# Patient Record
Sex: MALE | Race: WHITE | NOT HISPANIC OR LATINO | Employment: FULL TIME | URBAN - METROPOLITAN AREA
[De-identification: names, ages, dates, MRNs, and addresses within clinical notes are randomized per-mention and may not be internally consistent; named-entity substitution may affect disease eponyms.]

---

## 2023-02-22 LAB
ALANINE AMINOTRANSFERASE (SGPT) (U/L) IN SER/PLAS: 19 U/L (ref 10–52)
ALBUMIN (G/DL) IN SER/PLAS: 4.4 G/DL (ref 3.4–5)
ALBUMIN (MG/L) IN URINE: 119.8 MG/L
ALBUMIN/CREATININE (UG/MG) IN URINE: 26.3 UG/MG CRT (ref 0–30)
ALKALINE PHOSPHATASE (U/L) IN SER/PLAS: 60 U/L (ref 33–120)
ANION GAP IN SER/PLAS: 11 MMOL/L (ref 10–20)
ASPARTATE AMINOTRANSFERASE (SGOT) (U/L) IN SER/PLAS: 16 U/L (ref 9–39)
BILIRUBIN TOTAL (MG/DL) IN SER/PLAS: 0.7 MG/DL (ref 0–1.2)
CALCIUM (MG/DL) IN SER/PLAS: 9.8 MG/DL (ref 8.6–10.3)
CARBON DIOXIDE, TOTAL (MMOL/L) IN SER/PLAS: 30 MMOL/L (ref 21–32)
CHLORIDE (MMOL/L) IN SER/PLAS: 104 MMOL/L (ref 98–107)
CHOLESTEROL (MG/DL) IN SER/PLAS: 121 MG/DL (ref 0–199)
CHOLESTEROL IN HDL (MG/DL) IN SER/PLAS: 55.8 MG/DL
CHOLESTEROL/HDL RATIO: 2.2
CREATININE (MG/DL) IN SER/PLAS: 1.46 MG/DL (ref 0.5–1.3)
CREATININE (MG/DL) IN URINE: 456 MG/DL (ref 20–370)
ESTIMATED AVERAGE GLUCOSE FOR HBA1C: 154 MG/DL
GFR MALE: 56 ML/MIN/1.73M2
GLUCOSE (MG/DL) IN SER/PLAS: 190 MG/DL (ref 74–99)
HEMOGLOBIN A1C/HEMOGLOBIN TOTAL IN BLOOD: 7 %
LDL: 52 MG/DL (ref 0–99)
POTASSIUM (MMOL/L) IN SER/PLAS: 4.3 MMOL/L (ref 3.5–5.3)
PROSTATE SPECIFIC ANTIGEN,SCREEN: 1.18 NG/ML (ref 0–4)
PROTEIN TOTAL: 6.8 G/DL (ref 6.4–8.2)
SODIUM (MMOL/L) IN SER/PLAS: 141 MMOL/L (ref 136–145)
THYROTROPIN (MIU/L) IN SER/PLAS BY DETECTION LIMIT <= 0.05 MIU/L: 1.66 MIU/L (ref 0.44–3.98)
TRIGLYCERIDE (MG/DL) IN SER/PLAS: 67 MG/DL (ref 0–149)
UREA NITROGEN (MG/DL) IN SER/PLAS: 19 MG/DL (ref 6–23)
VLDL: 13 MG/DL (ref 0–40)

## 2023-05-25 LAB
ESTIMATED AVERAGE GLUCOSE FOR HBA1C: 160 MG/DL
HEMOGLOBIN A1C/HEMOGLOBIN TOTAL IN BLOOD: 7.2 %

## 2023-10-29 DIAGNOSIS — E11.9 TYPE 2 DIABETES MELLITUS WITHOUT COMPLICATIONS (MULTI): ICD-10-CM

## 2023-10-30 RX ORDER — LEVOTHYROXINE SODIUM 100 UG/1
100 TABLET ORAL DAILY
COMMUNITY
End: 2023-11-29

## 2023-10-30 RX ORDER — LINAGLIPTIN 5 MG/1
5 TABLET, FILM COATED ORAL DAILY
COMMUNITY
Start: 2023-03-13 | End: 2023-11-21 | Stop reason: WASHOUT

## 2023-10-30 RX ORDER — METFORMIN HYDROCHLORIDE 500 MG/1
500 TABLET ORAL
COMMUNITY
End: 2023-11-21 | Stop reason: WASHOUT

## 2023-10-30 RX ORDER — METFORMIN HYDROCHLORIDE 500 MG/1
500 TABLET ORAL
Qty: 180 TABLET | Refills: 1 | Status: SHIPPED | OUTPATIENT
Start: 2023-10-30 | End: 2023-11-09 | Stop reason: SDUPTHER

## 2023-10-30 RX ORDER — ATORVASTATIN CALCIUM 10 MG/1
10 TABLET, FILM COATED ORAL DAILY
COMMUNITY
End: 2023-11-21

## 2023-10-30 RX ORDER — TADALAFIL 5 MG/1
TABLET ORAL
COMMUNITY
Start: 2019-08-26

## 2023-10-30 RX ORDER — TAMSULOSIN HYDROCHLORIDE 0.4 MG/1
CAPSULE ORAL
COMMUNITY
Start: 2023-10-29

## 2023-10-30 RX ORDER — SITAGLIPTIN 100 MG/1
100 TABLET, FILM COATED ORAL DAILY
COMMUNITY
End: 2023-11-21 | Stop reason: SDUPTHER

## 2023-10-30 RX ORDER — CELECOXIB 200 MG/1
200 CAPSULE ORAL DAILY PRN
COMMUNITY
Start: 2023-06-26 | End: 2023-11-21 | Stop reason: WASHOUT

## 2023-11-09 DIAGNOSIS — E11.9 TYPE 2 DIABETES MELLITUS WITHOUT COMPLICATIONS (MULTI): ICD-10-CM

## 2023-11-09 RX ORDER — METFORMIN HYDROCHLORIDE 500 MG/1
1500 TABLET ORAL DAILY
Qty: 270 TABLET | Refills: 1 | Status: SHIPPED | OUTPATIENT
Start: 2023-11-09 | End: 2023-11-21 | Stop reason: SDUPTHER

## 2023-11-10 DIAGNOSIS — R73.9 HYPERGLYCEMIA: Primary | ICD-10-CM

## 2023-11-13 ENCOUNTER — LAB (OUTPATIENT)
Dept: LAB | Facility: LAB | Age: 58
End: 2023-11-13
Payer: COMMERCIAL

## 2023-11-13 DIAGNOSIS — R73.9 HYPERGLYCEMIA: ICD-10-CM

## 2023-11-13 LAB
ALBUMIN SERPL BCP-MCNC: 4.5 G/DL (ref 3.4–5)
ALP SERPL-CCNC: 57 U/L (ref 33–120)
ALT SERPL W P-5'-P-CCNC: 22 U/L (ref 10–52)
ANION GAP SERPL CALC-SCNC: 13 MMOL/L (ref 10–20)
AST SERPL W P-5'-P-CCNC: 19 U/L (ref 9–39)
BILIRUB SERPL-MCNC: 0.7 MG/DL (ref 0–1.2)
BUN SERPL-MCNC: 22 MG/DL (ref 6–23)
CALCIUM SERPL-MCNC: 9.7 MG/DL (ref 8.6–10.3)
CHLORIDE SERPL-SCNC: 102 MMOL/L (ref 98–107)
CHOLEST SERPL-MCNC: 137 MG/DL (ref 0–199)
CHOLESTEROL/HDL RATIO: 2.6
CO2 SERPL-SCNC: 27 MMOL/L (ref 21–32)
CREAT SERPL-MCNC: 1.32 MG/DL (ref 0.5–1.3)
EST. AVERAGE GLUCOSE BLD GHB EST-MCNC: 166 MG/DL
GFR SERPL CREATININE-BSD FRML MDRD: 63 ML/MIN/1.73M*2
GLUCOSE SERPL-MCNC: 169 MG/DL (ref 74–99)
HBA1C MFR BLD: 7.4 %
HDLC SERPL-MCNC: 52.2 MG/DL
LDLC SERPL CALC-MCNC: 67 MG/DL
NON HDL CHOLESTEROL: 85 MG/DL (ref 0–149)
POTASSIUM SERPL-SCNC: 4 MMOL/L (ref 3.5–5.3)
PROT SERPL-MCNC: 6.8 G/DL (ref 6.4–8.2)
SODIUM SERPL-SCNC: 138 MMOL/L (ref 136–145)
TRIGL SERPL-MCNC: 88 MG/DL (ref 0–149)
TSH SERPL-ACNC: 1.77 MIU/L (ref 0.44–3.98)
VLDL: 18 MG/DL (ref 0–40)

## 2023-11-13 PROCEDURE — 80061 LIPID PANEL: CPT

## 2023-11-13 PROCEDURE — 36415 COLL VENOUS BLD VENIPUNCTURE: CPT

## 2023-11-13 PROCEDURE — 84443 ASSAY THYROID STIM HORMONE: CPT

## 2023-11-13 PROCEDURE — 83036 HEMOGLOBIN GLYCOSYLATED A1C: CPT

## 2023-11-13 PROCEDURE — 80053 COMPREHEN METABOLIC PANEL: CPT

## 2023-11-20 ENCOUNTER — LAB (OUTPATIENT)
Dept: LAB | Facility: LAB | Age: 58
End: 2023-11-20
Payer: COMMERCIAL

## 2023-11-20 DIAGNOSIS — R73.9 HYPERGLYCEMIA: ICD-10-CM

## 2023-11-20 LAB
CREAT UR-MCNC: 96.1 MG/DL (ref 20–370)
MICROALBUMIN UR-MCNC: <7 MG/L
MICROALBUMIN/CREAT UR: NORMAL MG/G{CREAT}

## 2023-11-20 PROCEDURE — 82043 UR ALBUMIN QUANTITATIVE: CPT

## 2023-11-20 PROCEDURE — 82570 ASSAY OF URINE CREATININE: CPT

## 2023-11-21 ENCOUNTER — TELEMEDICINE (OUTPATIENT)
Dept: ENDOCRINOLOGY | Facility: CLINIC | Age: 58
End: 2023-11-21
Payer: COMMERCIAL

## 2023-11-21 DIAGNOSIS — E03.9 HYPOTHYROIDISM, UNSPECIFIED TYPE: ICD-10-CM

## 2023-11-21 DIAGNOSIS — E78.2 MIXED HYPERLIPIDEMIA: ICD-10-CM

## 2023-11-21 DIAGNOSIS — E11.9 TYPE 2 DIABETES MELLITUS WITHOUT COMPLICATIONS (MULTI): ICD-10-CM

## 2023-11-21 DIAGNOSIS — E11.69 TYPE 2 DIABETES MELLITUS WITH OTHER SPECIFIED COMPLICATION, UNSPECIFIED WHETHER LONG TERM INSULIN USE (MULTI): Primary | ICD-10-CM

## 2023-11-21 PROBLEM — E78.5 HYPERLIPIDEMIA: Status: ACTIVE | Noted: 2023-11-21

## 2023-11-21 PROCEDURE — 99443 PR PHYS/QHP TELEPHONE EVALUATION 21-30 MIN: CPT | Performed by: HOSPITALIST

## 2023-11-21 RX ORDER — METFORMIN HYDROCHLORIDE 500 MG/1
1000 TABLET ORAL
Qty: 360 TABLET | Refills: 0 | Status: SHIPPED | OUTPATIENT
Start: 2023-11-21 | End: 2024-04-15

## 2023-11-21 RX ORDER — SITAGLIPTIN 100 MG/1
100 TABLET, FILM COATED ORAL DAILY
Qty: 90 TABLET | Refills: 2 | Status: SHIPPED | OUTPATIENT
Start: 2023-11-21

## 2023-11-21 RX ORDER — ATORVASTATIN CALCIUM 10 MG/1
10 TABLET, FILM COATED ORAL DAILY
Qty: 90 TABLET | Refills: 1 | Status: SHIPPED | OUTPATIENT
Start: 2023-11-21

## 2023-11-21 ASSESSMENT — ENCOUNTER SYMPTOMS
SLEEP DISTURBANCE: 0
FREQUENCY: 0
ABDOMINAL DISTENTION: 0
CONSTITUTIONAL NEGATIVE: 1
NERVOUS/ANXIOUS: 0
EYE ITCHING: 0
CONSTIPATION: 0
AGITATION: 0
DYSURIA: 0
DIARRHEA: 0
NAUSEA: 0
ABDOMINAL PAIN: 0
PHOTOPHOBIA: 0
SHORTNESS OF BREATH: 0
PALPITATIONS: 0
SORE THROAT: 0
ARTHRALGIAS: 0
VOMITING: 0
LIGHT-HEADEDNESS: 0
TROUBLE SWALLOWING: 0
TREMORS: 0
VOICE CHANGE: 0
HEADACHES: 0
CHEST TIGHTNESS: 0

## 2023-11-21 NOTE — PROGRESS NOTES
Subjective   Patient ID: Jessee Mead is a 58 y.o. male who presents for Diabetes.  Diabetes  Pertinent negatives for hypoglycemia include no headaches, nervousness/anxiousness, pallor or tremors. Pertinent negatives for diabetes include no chest pain and no polyuria.     See AP     Review of Systems   Constitutional: Negative.    HENT:  Negative for sore throat, trouble swallowing and voice change.    Eyes:  Negative for photophobia, itching and visual disturbance.   Respiratory:  Negative for chest tightness and shortness of breath.    Cardiovascular:  Negative for chest pain and palpitations.   Gastrointestinal:  Negative for abdominal distention, abdominal pain, constipation, diarrhea, nausea and vomiting.   Endocrine: Negative for cold intolerance, heat intolerance and polyuria.   Genitourinary:  Negative for dysuria and frequency.   Musculoskeletal:  Negative for arthralgias.   Skin:  Negative for pallor.   Allergic/Immunologic: Negative for environmental allergies.   Neurological:  Negative for tremors, light-headedness and headaches.   Psychiatric/Behavioral:  Negative for agitation and sleep disturbance. The patient is not nervous/anxious.        Objective   Physical Exam  Not done as it was a tele visit     Assessment/Plan   Diagnoses and all orders for this visit:  Type 2 diabetes mellitus with other specified complication, unspecified whether long term insulin use (CMS/[facility])  Mixed hyperlipidemia  Hypothyroidism, unspecified type     57 yo man with T2 DM came for follow up   Dx ~ 20 yrs ago       Current regimen:   Metformin 500 mg - 3 tabs a day ( denies any diarrhea )   Januvia 100 mg daily       glipizide was discontinued in past due to low Bgs but he was taking rarely for high and when he was on Gcs     Continues to do MODIFIED KETO DIET   Interval history : severe back pain - 2 GC injections, now he has no back pain , started treadmill again   Checks BG 1-2 / d- higher BG as per him       A1c  7.4%, which is above goal       PLAN:   - INCREASE metformin to 500 mg 2 tab BID with  food   - januvia - continue   -HOLD OFF On rybelsus   - check BG at least twice a day and bring glucometer next visit   - continue statin, LDL at goal   - not on ACEi/ ARB, will discuss next visit   - clinically and biochemically euthyroid, continue same dose of levothyroxine       RTC 4m       SH- travels a lot for work, runs marathons in past. very active in general. daughter doing doctorate in PT   On  modified keto.   He travelled to [place] in Sept 2023  Back pain improved.   kids live in [place]. Their neighbor has a bird sanctuary

## 2023-11-29 DIAGNOSIS — E03.9 HYPOTHYROIDISM, UNSPECIFIED: ICD-10-CM

## 2023-11-29 RX ORDER — LEVOTHYROXINE SODIUM 100 UG/1
100 TABLET ORAL DAILY
Qty: 90 TABLET | Refills: 1 | Status: SHIPPED | OUTPATIENT
Start: 2023-11-29

## 2024-02-14 ENCOUNTER — LAB (OUTPATIENT)
Dept: LAB | Facility: LAB | Age: 59
End: 2024-02-14
Payer: COMMERCIAL

## 2024-02-14 DIAGNOSIS — E11.69 TYPE 2 DIABETES MELLITUS WITH OTHER SPECIFIED COMPLICATION, UNSPECIFIED WHETHER LONG TERM INSULIN USE (MULTI): ICD-10-CM

## 2024-02-14 LAB
ALBUMIN SERPL BCP-MCNC: 4.5 G/DL (ref 3.4–5)
ALP SERPL-CCNC: 53 U/L (ref 33–120)
ALT SERPL W P-5'-P-CCNC: 19 U/L (ref 10–52)
ANION GAP SERPL CALC-SCNC: 11 MMOL/L (ref 10–20)
AST SERPL W P-5'-P-CCNC: 16 U/L (ref 9–39)
BILIRUB SERPL-MCNC: 0.7 MG/DL (ref 0–1.2)
BUN SERPL-MCNC: 18 MG/DL (ref 6–23)
CALCIUM SERPL-MCNC: 9.5 MG/DL (ref 8.6–10.3)
CHLORIDE SERPL-SCNC: 102 MMOL/L (ref 98–107)
CHOLEST SERPL-MCNC: 139 MG/DL (ref 0–199)
CHOLESTEROL/HDL RATIO: 2.8
CO2 SERPL-SCNC: 31 MMOL/L (ref 21–32)
CREAT SERPL-MCNC: 1.29 MG/DL (ref 0.5–1.3)
EGFRCR SERPLBLD CKD-EPI 2021: 65 ML/MIN/1.73M*2
EST. AVERAGE GLUCOSE BLD GHB EST-MCNC: 160 MG/DL
GLUCOSE SERPL-MCNC: 176 MG/DL (ref 74–99)
HBA1C MFR BLD: 7.2 %
HDLC SERPL-MCNC: 50.4 MG/DL
LDLC SERPL CALC-MCNC: 67 MG/DL
NON HDL CHOLESTEROL: 89 MG/DL (ref 0–149)
POTASSIUM SERPL-SCNC: 4.4 MMOL/L (ref 3.5–5.3)
PROT SERPL-MCNC: 6.8 G/DL (ref 6.4–8.2)
SODIUM SERPL-SCNC: 140 MMOL/L (ref 136–145)
TRIGL SERPL-MCNC: 106 MG/DL (ref 0–149)
VLDL: 21 MG/DL (ref 0–40)

## 2024-02-14 PROCEDURE — 80061 LIPID PANEL: CPT

## 2024-02-14 PROCEDURE — 80053 COMPREHEN METABOLIC PANEL: CPT

## 2024-02-14 PROCEDURE — 83036 HEMOGLOBIN GLYCOSYLATED A1C: CPT

## 2024-02-14 PROCEDURE — 36415 COLL VENOUS BLD VENIPUNCTURE: CPT

## 2024-02-15 ENCOUNTER — APPOINTMENT (OUTPATIENT)
Dept: ENDOCRINOLOGY | Facility: CLINIC | Age: 59
End: 2024-02-15
Payer: COMMERCIAL

## 2024-03-26 DIAGNOSIS — E11.69 TYPE 2 DIABETES MELLITUS WITH OTHER SPECIFIED COMPLICATION, UNSPECIFIED WHETHER LONG TERM INSULIN USE (MULTI): Primary | ICD-10-CM

## 2024-03-26 RX ORDER — BLOOD SUGAR DIAGNOSTIC
STRIP MISCELLANEOUS
Qty: 200 STRIP | Refills: 1 | Status: SHIPPED | OUTPATIENT
Start: 2024-03-26

## 2024-04-13 DIAGNOSIS — E11.69 TYPE 2 DIABETES MELLITUS WITH OTHER SPECIFIED COMPLICATION, UNSPECIFIED WHETHER LONG TERM INSULIN USE (MULTI): ICD-10-CM

## 2024-04-13 DIAGNOSIS — E11.9 TYPE 2 DIABETES MELLITUS WITHOUT COMPLICATIONS (MULTI): ICD-10-CM

## 2024-04-15 RX ORDER — METFORMIN HYDROCHLORIDE 500 MG/1
TABLET ORAL
Qty: 270 TABLET | Refills: 0 | Status: SHIPPED | OUTPATIENT
Start: 2024-04-15

## 2024-06-13 ENCOUNTER — APPOINTMENT (OUTPATIENT)
Dept: ENDOCRINOLOGY | Facility: CLINIC | Age: 59
End: 2024-06-13
Payer: COMMERCIAL

## 2024-06-13 VITALS
HEIGHT: 68 IN | BODY MASS INDEX: 26.07 KG/M2 | DIASTOLIC BLOOD PRESSURE: 77 MMHG | WEIGHT: 172 LBS | SYSTOLIC BLOOD PRESSURE: 133 MMHG

## 2024-06-13 DIAGNOSIS — E11.9 TYPE 2 DIABETES MELLITUS WITHOUT COMPLICATIONS (MULTI): ICD-10-CM

## 2024-06-13 DIAGNOSIS — E11.69 TYPE 2 DIABETES MELLITUS WITH OTHER SPECIFIED COMPLICATION, UNSPECIFIED WHETHER LONG TERM INSULIN USE (MULTI): ICD-10-CM

## 2024-06-13 LAB
POC FINGERSTICK BLOOD GLUCOSE: 148 MG/DL (ref 70–100)
POC HEMOGLOBIN A1C: 6.9 % (ref 4.2–6.5)

## 2024-06-13 PROCEDURE — 3075F SYST BP GE 130 - 139MM HG: CPT | Performed by: HOSPITALIST

## 2024-06-13 PROCEDURE — 3048F LDL-C <100 MG/DL: CPT | Performed by: HOSPITALIST

## 2024-06-13 PROCEDURE — 83036 HEMOGLOBIN GLYCOSYLATED A1C: CPT | Performed by: HOSPITALIST

## 2024-06-13 PROCEDURE — 82962 GLUCOSE BLOOD TEST: CPT | Performed by: HOSPITALIST

## 2024-06-13 PROCEDURE — 3051F HG A1C>EQUAL 7.0%<8.0%: CPT | Performed by: HOSPITALIST

## 2024-06-13 PROCEDURE — 3078F DIAST BP <80 MM HG: CPT | Performed by: HOSPITALIST

## 2024-06-13 PROCEDURE — 99214 OFFICE O/P EST MOD 30 MIN: CPT | Performed by: HOSPITALIST

## 2024-06-13 RX ORDER — METFORMIN HYDROCHLORIDE 500 MG/1
1000 TABLET ORAL
Qty: 180 TABLET | Refills: 2 | Status: SHIPPED | OUTPATIENT
Start: 2024-06-13

## 2024-06-13 ASSESSMENT — ENCOUNTER SYMPTOMS
ABDOMINAL PAIN: 0
LIGHT-HEADEDNESS: 0
NAUSEA: 0
ARTHRALGIAS: 0
DYSURIA: 0
FREQUENCY: 0
ABDOMINAL DISTENTION: 0
PALPITATIONS: 0
CHEST TIGHTNESS: 0
VOMITING: 0
EYE ITCHING: 0
DIARRHEA: 0
SHORTNESS OF BREATH: 0
PHOTOPHOBIA: 0
TROUBLE SWALLOWING: 0
TREMORS: 0
SORE THROAT: 0
CONSTIPATION: 0
HEADACHES: 0
AGITATION: 0
CONSTITUTIONAL NEGATIVE: 1
SLEEP DISTURBANCE: 0
NERVOUS/ANXIOUS: 0
VOICE CHANGE: 0

## 2024-06-13 NOTE — PROGRESS NOTES
Subjective   Patient ID: Jessee Mead is a 58 y.o. male who presents for Diabetes (Dx dm: prior 2003 /Failed: glipizide (low bg)/PCP: Dr. Ramirez/Podiatry: does not see one /Eye exam: 1/2024/Pt testing glucose 1-2 times daily, did not bring meter  /2/14/24 hga1c 7.2%).  Lab Results   Component Value Date    HGBA1C 6.9 (A) 06/13/2024      HPI   See AP     Review of Systems   Constitutional: Negative.    HENT:  Negative for sore throat, trouble swallowing and voice change.    Eyes:  Negative for photophobia, itching and visual disturbance.   Respiratory:  Negative for chest tightness and shortness of breath.    Cardiovascular:  Negative for chest pain and palpitations.   Gastrointestinal:  Negative for abdominal distention, abdominal pain, constipation, diarrhea, nausea and vomiting.   Endocrine: Negative for cold intolerance, heat intolerance and polyuria.   Genitourinary:  Negative for dysuria and frequency.   Musculoskeletal:  Negative for arthralgias.   Skin:  Negative for pallor.   Allergic/Immunologic: Negative for environmental allergies.   Neurological:  Negative for tremors, light-headedness and headaches.   Psychiatric/Behavioral:  Negative for agitation and sleep disturbance. The patient is not nervous/anxious.        Objective   Physical Exam  Constitutional:       Appearance: Normal appearance.   HENT:      Head: Normocephalic.      Nose: Nose normal.      Mouth/Throat:      Mouth: Mucous membranes are moist.   Eyes:      Extraocular Movements: Extraocular movements intact.   Cardiovascular:      Rate and Rhythm: Normal rate.   Pulmonary:      Effort: Pulmonary effort is normal. No respiratory distress.   Abdominal:      General: There is no distension.   Musculoskeletal:         General: Normal range of motion.      Cervical back: Normal range of motion and neck supple.   Skin:     General: Skin is warm and dry.   Neurological:      Mental Status: He is alert and oriented to person, place, and time.  "  Psychiatric:         Mood and Affect: Mood normal.       Visit Vitals  /77   Ht 1.727 m (5' 8\")   Wt 78 kg (172 lb)   BMI 26.15 kg/m²   BSA 1.93 m²        Assessment/Plan           57 yo man with T2 DM came for follow up   Dx ~ 20 yrs ago       Current regimen:   Metformin 500 mg - 2 tab BID   Januvia 100 mg daily       glipizide was discontinued in past due to low Bgs but he was taking rarely for high and when he was on Gcs . Occ took metaglip 2/ month causing low BG      Continues to do MODIFIED KETO DIET   Interval history : severe back pain - 2 GC injections, now he has no back pain , started treadmill again   Checks BG 1-2 / d- higher BG as per him       A1c 7.4%, which is above goal       PLAN:   - discussed GLP 1 agonist given he has been progressively gaining wieght and wants to loose weight   - discussed SE in detail MOA, SE risks   - start rybelsus 3 mg  - Am alone 16 oz water and sarahi 30 min later   - will stop januvia 1 m after rybelsus   - continue same metformin   - check BG at least twice a day and bring glucometer next visit   - continue statin, LDL at goal   - not on ACEi/ ARB, will discuss next visit   - clinically and biochemically euthyroid, continue same dose of levothyroxine         Given DEXCOM G 7 cgm sample   \   RTC 4m       SH- travels a lot for work, runs marathons in past. very active in general. daughter doing doctorate in PT   On  modified keto. / IF   Planning marathon in sept again   He travelled to [place] in Sept 2023  Back pain improved.   kids live in [place]. Their neighbor has a bird sanctuary        "

## 2024-06-17 DIAGNOSIS — E11.9 TYPE 2 DIABETES MELLITUS WITHOUT COMPLICATIONS (MULTI): ICD-10-CM

## 2024-06-17 DIAGNOSIS — E03.9 HYPOTHYROIDISM, UNSPECIFIED: ICD-10-CM

## 2024-06-17 RX ORDER — LEVOTHYROXINE SODIUM 100 UG/1
100 TABLET ORAL DAILY
Qty: 90 TABLET | Refills: 0 | Status: SHIPPED | OUTPATIENT
Start: 2024-06-17

## 2024-06-17 RX ORDER — ATORVASTATIN CALCIUM 10 MG/1
10 TABLET, FILM COATED ORAL DAILY
Qty: 90 TABLET | Refills: 0 | Status: SHIPPED | OUTPATIENT
Start: 2024-06-17

## 2024-08-23 ENCOUNTER — APPOINTMENT (OUTPATIENT)
Dept: ENDOCRINOLOGY | Facility: CLINIC | Age: 59
End: 2024-08-23
Payer: COMMERCIAL

## 2024-08-23 DIAGNOSIS — E78.2 MIXED HYPERLIPIDEMIA: ICD-10-CM

## 2024-08-23 DIAGNOSIS — E11.69 TYPE 2 DIABETES MELLITUS WITH OTHER SPECIFIED COMPLICATION, UNSPECIFIED WHETHER LONG TERM INSULIN USE (MULTI): Primary | ICD-10-CM

## 2024-08-23 DIAGNOSIS — E03.9 HYPOTHYROIDISM, UNSPECIFIED TYPE: ICD-10-CM

## 2024-08-23 DIAGNOSIS — E11.9 TYPE 2 DIABETES MELLITUS WITHOUT COMPLICATIONS (MULTI): ICD-10-CM

## 2024-08-23 PROCEDURE — 3051F HG A1C>EQUAL 7.0%<8.0%: CPT | Performed by: HOSPITALIST

## 2024-08-23 PROCEDURE — 3048F LDL-C <100 MG/DL: CPT | Performed by: HOSPITALIST

## 2024-08-23 PROCEDURE — 99214 OFFICE O/P EST MOD 30 MIN: CPT | Performed by: HOSPITALIST

## 2024-08-23 RX ORDER — METFORMIN HYDROCHLORIDE 500 MG/1
1000 TABLET ORAL
Qty: 180 TABLET | Refills: 2 | Status: SHIPPED | OUTPATIENT
Start: 2024-08-23

## 2024-08-23 ASSESSMENT — ENCOUNTER SYMPTOMS
PHOTOPHOBIA: 0
FREQUENCY: 0
NAUSEA: 0
EYE ITCHING: 0
SLEEP DISTURBANCE: 0
HEADACHES: 0
PALPITATIONS: 0
ARTHRALGIAS: 0
ABDOMINAL DISTENTION: 0
NERVOUS/ANXIOUS: 0
DIARRHEA: 0
LIGHT-HEADEDNESS: 0
TREMORS: 0
CONSTITUTIONAL NEGATIVE: 1
VOMITING: 0
DYSURIA: 0
VOICE CHANGE: 0
CHEST TIGHTNESS: 0
ABDOMINAL PAIN: 0
SORE THROAT: 0
SHORTNESS OF BREATH: 0
AGITATION: 0
CONSTIPATION: 0
TROUBLE SWALLOWING: 0

## 2024-08-23 NOTE — PROGRESS NOTES
Subjective   Patient ID: Jessee Mead is a 58 y.o. male who presents for Diabetes (VIRTUAL VISIT:::: /Dx dm: prior 2003 /Failed: glipizide (low bg)/PCP: Dr. Ramirez/Podiatry: does not see one /Eye exam: 1/2024/Pt testing glucose 1-2 times daily).  Lab Results   Component Value Date    HGBA1C 6.9 (A) 06/13/2024      HPI   See AP     Review of Systems   Constitutional: Negative.    HENT:  Negative for sore throat, trouble swallowing and voice change.    Eyes:  Negative for photophobia, itching and visual disturbance.   Respiratory:  Negative for chest tightness and shortness of breath.    Cardiovascular:  Negative for chest pain and palpitations.   Gastrointestinal:  Negative for abdominal distention, abdominal pain, constipation, diarrhea, nausea and vomiting.   Endocrine: Negative for cold intolerance, heat intolerance and polyuria.   Genitourinary:  Negative for dysuria and frequency.   Musculoskeletal:  Negative for arthralgias.   Skin:  Negative for pallor.   Allergic/Immunologic: Negative for environmental allergies.   Neurological:  Negative for tremors, light-headedness and headaches.   Psychiatric/Behavioral:  Negative for agitation and sleep disturbance. The patient is not nervous/anxious.        Objective   Physical Exam NO vitals due to virtual visit      NAD AAOx3   MMM   EOM nml   Mood appropriate.    Assessment/Plan   Diagnoses and all orders for this visit:  Type 2 diabetes mellitus with other specified complication, unspecified whether long term insulin use (Multi)  -     metFORMIN (Glucophage) 500 mg tablet; Take 2 tablets (1,000 mg) by mouth 2 times daily (morning and late afternoon).  -     semaglutide (Rybelsus) 7 mg tablet; Take 1 tablet (7 mg) by mouth once daily.  -     Comprehensive metabolic panel; Future  -     Hemoglobin A1c; Future  -     Lipid Panel; Future  -     TSH with reflex to Free T4 if abnormal; Future  -     Albumin-Creatinine Ratio, Urine Random; Future  Type 2 diabetes  mellitus without complications (Multi)  -     metFORMIN (Glucophage) 500 mg tablet; Take 2 tablets (1,000 mg) by mouth 2 times daily (morning and late afternoon).  Mixed hyperlipidemia  Hypothyroidism, unspecified type           57 yo man with T2 DM came for follow up   Dx ~ 20 yrs ago       Current regimen:   Metformin 500 mg - 2 tab BID ( rare diarrhea)   Januvia 100 mg daily   Rybelsus 3 mg one daily ( did have nausea initially few days , it resolved)       glipizide was discontinued in past due to low Bgs but he was taking rarely for high and when he was on Gcs . Occ took metaglip 2/ month causing low BG      Continues to do MODIFIED KETO DIET , not doing IF anymore.   Interval history : severe back pain - 2 GC injections in past , also has knee pain    Not working out or running much as he  used to do     Checks BG 1-2 / d- BG are higher high 100s - 200s. He mentions he did loose 2 lbs ~ , but did not see any difference in the BG readings      PLAN:   - denies any SE from 3 mg dose.   - INCREASE DOSE TO 7 mg daily of rybelsus ( can use 3 mg by taking 2 of these)   - STOP JANUVIA  - continue metformin , discussed using ER if the diarrhea is consistent   - if BG > 180 consistently advise to call.      - may discuss SGLT2 inh in future.   - discussed SE in detail MOA, SE risks   - increase oral hydration.   - check BG at least twice a day and bring glucometer next visit.     - continue statin, LDL at goal   - not on ACEi/ ARB, will discuss next visit   - clinically  euthyroid, continue same dose of levothyroxine       Given DEXCOM G 7 cgm sample  in past        Due for labs : blood work ordered in EMR    RTC 4m       SH- travels a lot for work, runs marathons in past. very active in general. daughter doing doctorate in PT   On  modified keto.  Not  doing IF at this time.   Was planning marathon but now has left knee pain , has back pain too not running as  much.   He travelled to [facility] in Sept 2023  Back pain  improved.   kids live in [place]. Their neighbor has a bird sanctuary

## 2024-09-12 DIAGNOSIS — E11.9 TYPE 2 DIABETES MELLITUS WITHOUT COMPLICATIONS (MULTI): ICD-10-CM

## 2024-09-12 DIAGNOSIS — E03.9 HYPOTHYROIDISM, UNSPECIFIED: ICD-10-CM

## 2024-09-12 RX ORDER — ATORVASTATIN CALCIUM 10 MG/1
10 TABLET, FILM COATED ORAL DAILY
Qty: 90 TABLET | Refills: 0 | Status: SHIPPED | OUTPATIENT
Start: 2024-09-12

## 2024-09-12 RX ORDER — LEVOTHYROXINE SODIUM 100 UG/1
100 TABLET ORAL DAILY
Qty: 90 TABLET | Refills: 0 | Status: SHIPPED | OUTPATIENT
Start: 2024-09-12

## 2024-10-26 DIAGNOSIS — E03.9 HYPOTHYROIDISM, UNSPECIFIED: ICD-10-CM

## 2024-10-28 RX ORDER — LEVOTHYROXINE SODIUM 100 UG/1
TABLET ORAL
Qty: 90 TABLET | Refills: 0 | Status: SHIPPED | OUTPATIENT
Start: 2024-10-28

## 2024-11-21 ENCOUNTER — APPOINTMENT (OUTPATIENT)
Dept: ENDOCRINOLOGY | Facility: CLINIC | Age: 59
End: 2024-11-21
Payer: COMMERCIAL

## 2024-11-21 VITALS
HEIGHT: 68 IN | SYSTOLIC BLOOD PRESSURE: 144 MMHG | DIASTOLIC BLOOD PRESSURE: 86 MMHG | BODY MASS INDEX: 25.46 KG/M2 | WEIGHT: 168 LBS

## 2024-11-21 DIAGNOSIS — E11.9 TYPE 2 DIABETES MELLITUS WITHOUT COMPLICATIONS (MULTI): ICD-10-CM

## 2024-11-21 DIAGNOSIS — E11.69 TYPE 2 DIABETES MELLITUS WITH OTHER SPECIFIED COMPLICATION, UNSPECIFIED WHETHER LONG TERM INSULIN USE (MULTI): Primary | ICD-10-CM

## 2024-11-21 DIAGNOSIS — E03.9 HYPOTHYROIDISM, UNSPECIFIED TYPE: ICD-10-CM

## 2024-11-21 LAB
POC FINGERSTICK BLOOD GLUCOSE: 163 MG/DL (ref 70–100)
POC HEMOGLOBIN A1C: 7.7 % (ref 4.2–6.5)

## 2024-11-21 PROCEDURE — 82962 GLUCOSE BLOOD TEST: CPT | Performed by: HOSPITALIST

## 2024-11-21 PROCEDURE — 99214 OFFICE O/P EST MOD 30 MIN: CPT | Performed by: HOSPITALIST

## 2024-11-21 PROCEDURE — 83036 HEMOGLOBIN GLYCOSYLATED A1C: CPT | Performed by: HOSPITALIST

## 2024-11-21 PROCEDURE — 3051F HG A1C>EQUAL 7.0%<8.0%: CPT | Performed by: HOSPITALIST

## 2024-11-21 PROCEDURE — 3077F SYST BP >= 140 MM HG: CPT | Performed by: HOSPITALIST

## 2024-11-21 PROCEDURE — 3079F DIAST BP 80-89 MM HG: CPT | Performed by: HOSPITALIST

## 2024-11-21 PROCEDURE — 3048F LDL-C <100 MG/DL: CPT | Performed by: HOSPITALIST

## 2024-11-21 PROCEDURE — 3008F BODY MASS INDEX DOCD: CPT | Performed by: HOSPITALIST

## 2024-11-21 RX ORDER — TAMSULOSIN HYDROCHLORIDE 0.4 MG/1
0.4 CAPSULE ORAL DAILY
COMMUNITY

## 2024-11-21 RX ORDER — METFORMIN HYDROCHLORIDE 500 MG/1
1000 TABLET, EXTENDED RELEASE ORAL
COMMUNITY
End: 2024-11-21 | Stop reason: SDUPTHER

## 2024-11-21 RX ORDER — METFORMIN HYDROCHLORIDE 500 MG/1
1000 TABLET, EXTENDED RELEASE ORAL
Qty: 360 TABLET | Refills: 2 | Status: SHIPPED | OUTPATIENT
Start: 2024-11-21

## 2024-11-21 NOTE — PROGRESS NOTES
"Subjective   Patient ID: Jessee Mead is a 59 y.o. male who presents for Diabetes (Dx dm: prior 2003 /Failed: glipizide (low bg)/PCP: Dr. Ramirez/Podiatry: does not see one /Eye exam: 1/2024/Pt testing glucose 1-2 times daily/6/13/2024 hga1c 6.9% /Not happy with numbers- did bring food logs ).  Lab Results   Component Value Date    HGBA1C 7.7 (A) 11/21/2024      HPI  See assessment and plan  Review of Systems    Objective   Physical Exam Visit Vitals  /86   Ht 1.727 m (5' 8\")   Wt 76.2 kg (168 lb)   BMI 25.54 kg/m²   BSA 1.91 m²        Assessment/Plan   Diagnoses and all orders for this visit:  Type 2 diabetes mellitus with other specified complication, unspecified whether long term insulin use (Multi)  -     POCT glucose manually resulted  -     POCT glycosylated hemoglobin (Hb A1C) manually resulted  -     SITagliptin phosphate (Januvia) 100 mg tablet; Take 1 tablet (100 mg) by mouth once daily.  -     metFORMIN  mg 24 hr tablet; Take 2 tablets (1,000 mg) by mouth 2 times daily (morning and late afternoon). Do not crush, chew, or split.  -     Referral to Clinical Pharmacy; Future  Type 2 diabetes mellitus without complications (Multi)  Hypothyroidism, unspecified type              60yo man with T2 DM came for follow up   Dx ~ 20 yrs ago       Current regimen:   Metformin 500 mg - 2 tab BID   Rybelsus 7 mg once daily    Januvia started when dose of Rybelsus was increased from 3 to 7 mg  He mentions blood sugars were better controlled on Januvia and Rybelsus  But he has been slightly less active than usual, he was unable to run a half marathon that he was training in September  He keeps a very close eye on his diet and notes down what he eats daily with his blood sugar readings  He does take occasional glipizide small dose if blood sugar is high    Continues to do MODIFIED KETO DIET most of the time  Interval history : Glucocorticoid injection in the past  Slightly less active than before but " continues to Trysul  Checks BG 1-2 / d- high 100-200 sometimes      A1c 7.7% above goal      PLAN:   Discussed stopping Rybelsus and starting Mounjaro but  He would like to try Januvia instead of that    Advised that he can try metformin and Januvia for 1 month if blood sugars are better controlled we will continue that for now  My preference would be to add Mounjaro once weekly instead of Rybelsus  Given sample of Mounjaro 2.5 mg once weekly.  If blood sugar not controlled on metformin and Januvia advised to start 2.5 mg once weekly Mounjaro for 2 weeks if no side effects will send 5 mg once weekly thereafter    Continue lifestyle modification.  Discussed mechanism of action side effects including pancreatitis, cholecystitis, dehydration, GI side effects with Mounjaro    -Continue to check BG at least twice a day   - continue statin, LDL at goal   - not on ACEi/ ARB, will discuss next visit      Given DEXCOM G 7 cgm sample in past.  Will hold off trying that given it might give too much information and may make him overwhelmed      # Hypothyroidism  Clinically euthyroid  Levothyroxine 100 mcg daily    TFT next visit.  Last TFT in 2023  \   RTC 1 month virtually      SH- travels a lot for work, runs marathons in past. very active in general. daughter doing doctorate in PT   On  modified keto. / IF   Could not run half marathon in September 2024 due to knee pain  He travelled to [place] in Sept 2023  Back pain improved.   kids live in [place]. Their neighbor has a bird sanctuary

## 2024-12-09 DIAGNOSIS — E11.9 TYPE 2 DIABETES MELLITUS WITHOUT COMPLICATIONS (MULTI): ICD-10-CM

## 2024-12-10 RX ORDER — ATORVASTATIN CALCIUM 10 MG/1
10 TABLET, FILM COATED ORAL DAILY
Qty: 90 TABLET | Refills: 0 | Status: SHIPPED | OUTPATIENT
Start: 2024-12-10

## 2024-12-11 NOTE — PROGRESS NOTES
Patient is sent at the request of Azael Desai MD for my opinion regarding diabetes.  My recommendations below will be communicated back to the requesting provider by way of shared medical record.    Recommendations:   Stop Januvia and start Mounjaro 2.5mg once weekly on 12/28  Continue metformin  ________________________________________________________________________    Subjective   Past Medical History:  Patient Active Problem List   Diagnosis    Type 2 diabetes mellitus    Hyperlipidemia    Hypothyroidism     Interim:  Jessee Mead is a 59 y.o. male presents today for new patient visit with sima PharmD for Type 2 Diabetes Mellitus. Last seen by Azael Desai on 11/21/24 where endo wanted to switch Rybelsus to Mounjaro 2.5mg weekly and a sample was provided, however pt wanted to trial switching Rybelsus back to Januvia first to try to avoid an injection.    Today the patient reports his blood sugars have improved since resuming Januvia, however he still plans to trial Mounjaro but is waiting until 12/28 when he returns from traveling. States he prefers to avoid injections, but understands that that his blood sugars are above goal.    Diabetes Pharmacotherapy:    Metformin ER 1000 mg BID  Januvia 100mg daily  States that he has 60 days left  Insurance said that it wont be covered next year  **Has not started Mounjaro    Adherence: denies non-adherence    Previously trialed meds:   Glipizide - lows  Rybelsus - reports it is ineffective    Social:  Current diet: on average, 3 meals per day, reports he has been eating more carbs recently  Breakfast - 2 eggs + spinach + avocado + cheese  Lunch - salad + protein  Dinner - pulled pork + salad + chips  Snack - mixed nuts  Fluids - water, coffee (SF creamer + almond milk), diet pop rarely  Current exercise: biking and HIT, has a  for weight bearing exercise    Allergies:  Patient has no known allergies.    Medication list:  Current Outpatient  Medications   Medication Instructions    atorvastatin (LIPITOR) 10 mg, oral, Daily    blood sugar diagnostic (OneTouch Ultra Test) strip USE 2 STRIP DAILY    levothyroxine (Synthroid, Levoxyl) 100 mcg tablet TAKE 1 TABLET BY MOUTH EARLY IN THE MORNING.. TAKE ON AN EMPTY STOMACH AT THE SAME TIME EACH DAY    metFORMIN (GLUCOPHAGE) 1,000 mg, oral, 2 times daily (morning and late afternoon)    metFORMIN XR (GLUCOPHAGE-XR) 1,000 mg, oral, 2 times daily (morning and late afternoon), Do not crush, chew, or split.    SITagliptin phosphate (JANUVIA) 100 mg, oral, Daily    tadalafil (Cialis) 5 mg tablet Take by mouth.    tamsulosin (FLOMAX) 0.4 mg, Daily        Objective   Last Recorded Vitals:  BP Readings from Last 3 Encounters:   11/21/24 144/86   06/13/24 133/77   07/14/23 128/74     Wt Readings from Last 3 Encounters:   11/21/24 76.2 kg (168 lb)   06/13/24 78 kg (172 lb)   07/14/23 77.6 kg (171 lb)     BMI Readings from Last 1 Encounters:   11/21/24 25.54 kg/m²      Labs  A1C  Lab Results   Component Value Date    HGBA1C 7.7 (A) 11/21/2024    HGBA1C 8.0 (H) 09/24/2024    HGBA1C 6.9 (A) 06/13/2024     BMP/LFTs  Lab Results   Component Value Date    CREATININE 1.29 02/14/2024    CREATININE 1.32 (H) 11/13/2023    CREATININE 1.46 (H) 02/22/2023    EGFR 65 02/14/2024    EGFR 63 11/13/2023    GLUCOSE 176 (H) 02/14/2024     02/14/2024    K 4.4 02/14/2024     02/14/2024    CALCIUM 9.5 02/14/2024    CO2 31 02/14/2024    BUN 18 02/14/2024    ALT 19 02/14/2024    AST 16 02/14/2024    ALKPHOS 53 02/14/2024    BILITOT 0.7 02/14/2024     Lipids  Lab Results   Component Value Date    TRIG 106 02/14/2024    CHOL 139 02/14/2024    LDLF 52 02/22/2023    LDLCALC 67 02/14/2024    HDL 50.4 02/14/2024     Urine Albumin Creatinine Ratio  Lab Results   Component Value Date    MICROALBCREA  11/20/2023      Comment:      One or more analytes used in this calculation is outside of the analytical measurement range. Calculation cannot be  "performed.    MICROALBCREA 26.3 02/22/2023     Additional labs:  No results found for: \"FRUCTOSAMINE\", \"CPEPTIDE\", \"LIG94QM\", \"NTIB\", \"ZNT8A\", \"INSAB\"    Home glucose monitoring:  Hypoglycemia: denies readings <70 mg/dL or s/sx of hypoglycemia  Date FBG PreHS   12-Dec 170    11-Dec 133 134   10-Dec 174    6-Dec 140    5-Dec 160    4-Dec 159    3-Dec 147    2-Dec 118           134       Assessment/Plan   Type 2 Diabetes Mellitus  Goal A1C <6.5%, above goal as of 11/2024. AM readings from home above goal, no hypoglycemia noted. Pt has not yet started Mounjaro d/t upcoming travel from [place]. Reviewed glycemic goals to show additional glucose lowering needed, pt is agreeable to stopping Januvia and starting Mounjaro after his upcoming trip. Patient also plans to try and improve diet as well, reports he has been eating more carbs recently.  Plan:  Stop Januvia and start Mounjaro 2.5mg once weekly on 12/28  Continue metformin  Home glucose monitoring:   Patient encouraged to continue SMBG at least 2-3x/week, alternating FBG and 2hr PPBG  Education Provided to Patient:   Glycemic goals  Benefits and proper administration of Mounjaro  Primary prevention:   Therapy: Moderate intensity statin   LDL result meets goal (2/2024)  Renal:  CKD: stage 2 - GFR 60-89  ACR: Incalculable (11/2023)  Renal protective agents: soon to be GLP1  DM medications are dosed appropriately for renal function  Labs: due for ACR with next labs, unable to discuss today  PharmD follow-up: 1 month  Endo follow-up: 12/16/24    Patient agreeable to plan as above, contact information provided for any future questions or concerns.    Edmundo Aguero, Pia    Type of encounter: virtual    Continue all meds under the continuation of care with the referring provider and clinical pharmacy team.    "

## 2024-12-12 ENCOUNTER — APPOINTMENT (OUTPATIENT)
Dept: PHARMACY | Facility: HOSPITAL | Age: 59
End: 2024-12-12
Payer: COMMERCIAL

## 2024-12-12 DIAGNOSIS — E11.69 TYPE 2 DIABETES MELLITUS WITH OTHER SPECIFIED COMPLICATION, UNSPECIFIED WHETHER LONG TERM INSULIN USE (MULTI): ICD-10-CM

## 2024-12-16 ENCOUNTER — APPOINTMENT (OUTPATIENT)
Dept: ENDOCRINOLOGY | Facility: CLINIC | Age: 59
End: 2024-12-16
Payer: COMMERCIAL

## 2024-12-16 DIAGNOSIS — E78.2 MIXED HYPERLIPIDEMIA: ICD-10-CM

## 2024-12-16 DIAGNOSIS — E11.69 TYPE 2 DIABETES MELLITUS WITH OTHER SPECIFIED COMPLICATION, UNSPECIFIED WHETHER LONG TERM INSULIN USE (MULTI): Primary | ICD-10-CM

## 2024-12-16 DIAGNOSIS — E03.9 HYPOTHYROIDISM, UNSPECIFIED TYPE: ICD-10-CM

## 2024-12-16 PROCEDURE — 3048F LDL-C <100 MG/DL: CPT | Performed by: HOSPITALIST

## 2024-12-16 PROCEDURE — 99214 OFFICE O/P EST MOD 30 MIN: CPT | Performed by: HOSPITALIST

## 2024-12-16 PROCEDURE — 3051F HG A1C>EQUAL 7.0%<8.0%: CPT | Performed by: HOSPITALIST

## 2024-12-16 ASSESSMENT — ENCOUNTER SYMPTOMS
ARTHRALGIAS: 0
LIGHT-HEADEDNESS: 0
SLEEP DISTURBANCE: 0
FREQUENCY: 0
HEADACHES: 0
AGITATION: 0
CONSTIPATION: 0
NERVOUS/ANXIOUS: 0
EYE ITCHING: 0
DYSURIA: 0
PHOTOPHOBIA: 0
SORE THROAT: 0
NAUSEA: 0
PALPITATIONS: 0
VOICE CHANGE: 0
TROUBLE SWALLOWING: 0
SHORTNESS OF BREATH: 0
CONSTITUTIONAL NEGATIVE: 1
ABDOMINAL DISTENTION: 0
ABDOMINAL PAIN: 0
CHEST TIGHTNESS: 0
DIARRHEA: 0
TREMORS: 0
VOMITING: 0

## 2024-12-16 NOTE — PROGRESS NOTES
Subjective   Patient ID: Jessee Mead is a 59 y.o. male who presents for Diabetes (VIRTUAL VISIT::: /Dx dm: prior 2003 /Failed: glipizide (low bg)/PCP: Dr. Ramirez/Podiatry: does not see one /Eye exam: 1/2024/Pt testing glucose 1-2 times daily).  Lab Results   Component Value Date    HGBA1C 7.7 (A) 11/21/2024      HPI  See assessment and plan  Review of Systems   Constitutional: Negative.    HENT:  Negative for sore throat, trouble swallowing and voice change.    Eyes:  Negative for photophobia, itching and visual disturbance.   Respiratory:  Negative for chest tightness and shortness of breath.    Cardiovascular:  Negative for chest pain and palpitations.   Gastrointestinal:  Negative for abdominal distention, abdominal pain, constipation, diarrhea, nausea and vomiting.   Endocrine: Negative for cold intolerance, heat intolerance and polyuria.   Genitourinary:  Negative for dysuria and frequency.   Musculoskeletal:  Negative for arthralgias.   Skin:  Negative for pallor.   Allergic/Immunologic: Negative for environmental allergies.   Neurological:  Negative for tremors, light-headedness and headaches.   Psychiatric/Behavioral:  Negative for agitation and sleep disturbance. The patient is not nervous/anxious.        Objective   Physical Exam NO vitals due to virtual visit   NAD alert oriented  EOM normal  Mucous membrane moist  Mood appropriate    Assessment/Plan   Diagnoses and all orders for this visit:  Type 2 diabetes mellitus with other specified complication, unspecified whether long term insulin use (Multi)  Hypothyroidism, unspecified type  Mixed hyperlipidemia     58yo man with T2 DM came for follow up   Dx ~ 20 yrs ago       Current regimen:   Metformin 500 mg - 2 tab BID   Januvia 100 mg once daily    Past medications:   Stopped Rybelsus as it did not help his blood sugars as much as Januvia  Given sample of Mounjaro last visit which he has not tried as he plans to travel and will start on December 28  th    He mentioned last visit he had been slightly less active than usual, he was unable to run a half marathon that he was training in September 2024  He keeps a very close eye on his diet and notes down what he eats daily with his blood sugar readings  He does take occasional glipizide small dose if blood sugar is high    Continues to do MODIFIED KETO DIET most of the time  Interval history : Glucocorticoid injection in the past    Checks BG 1-2 / d-  -140s in morning , 130s later during day      A1c 7.7% last visit      PLAN:   Stop Januvia when starting Mounjaro.  He mentions he will try and if it does not help with his blood sugar control he will go back on Januvia.  He mentions Januvia might not be covered by insurance as they might cover saxagliptin instead    He might need a letter for coverage for Januvia if he plans to continue within future from now he does have 45 days worth of supply    Continue lifestyle modification.  Discussed mechanism of action side effects of Mounjaro last visit including pancreatitis, cholecystitis, dehydration, GI side effects with Mounjaro    -Continue to check BG at least twice a day   - continue statin, LDL at goal   - not on ACEi/ ARB, will discuss next visit      Given DEXCOM G 7 cgm sample in past.  Will hold off trying that given it might give too much information and may make him overwhelmed      # Hypothyroidism  Clinically euthyroid  Levothyroxine 100 mcg daily    Repeat TFT last TFT in 2023  \   RTC 2m  Continued clinical pharmacy follow-up      SH- travels a lot for work, runs marathons in past. very active in general. daughter doing doctorate in PT   On  modified keto. / IF   Could not run half marathon in September 2024 due to knee pain  He travelled to [place] in Sept 2023  Back pain improved.   kids live in [place]. Their neighbor has a bird sanctuary

## 2024-12-19 ENCOUNTER — TELEPHONE (OUTPATIENT)
Dept: ENDOCRINOLOGY | Facility: CLINIC | Age: 59
End: 2024-12-19
Payer: COMMERCIAL

## 2025-01-14 ENCOUNTER — APPOINTMENT (OUTPATIENT)
Dept: PHARMACY | Facility: HOSPITAL | Age: 60
End: 2025-01-14
Payer: COMMERCIAL

## 2025-01-14 DIAGNOSIS — E11.69 TYPE 2 DIABETES MELLITUS WITH OTHER SPECIFIED COMPLICATION, UNSPECIFIED WHETHER LONG TERM INSULIN USE (MULTI): ICD-10-CM

## 2025-01-14 RX ORDER — TIRZEPATIDE 5 MG/.5ML
5 INJECTION, SOLUTION SUBCUTANEOUS WEEKLY
Qty: 2 ML | Refills: 11 | Status: SHIPPED | OUTPATIENT
Start: 2025-01-14

## 2025-01-14 NOTE — PROGRESS NOTES
Patient is sent at the request of Azael Desai MD for my opinion regarding diabetes.  My recommendations below will be communicated back to the requesting provider by way of shared medical record.    Recommendations:   Increase Mounjaro to 5mg weekly  ________________________________________________________________________    Subjective   Past Medical History:  Patient Active Problem List   Diagnosis    Type 2 diabetes mellitus    Hyperlipidemia    Hypothyroidism     Interim:  Jessee Mead is a 59 y.o. male presents today for follow up visit with sima Palacio for Type 2 Diabetes Mellitus. Last seen by myself (Edmundo Aguero) on 12/12/24 where the patient was instructed to stop Januvia and Start Mounjaro 2.5mg once weekly on 12/28. Pt also seen by sima on 12/16/24 where this plan was echoed.    Today the patient reports he has been tolerating Mounjaro well, only had some mild diarrhea with the first dose and has been doing well since. Has noticed improvements in blood sugar since switching from Januvia to Mounjaro, but also reports he has been eating more carbs d/t the holidays and because the Mounjaro is decreasing how much his sugar spikes after carbs.    Diabetes Pharmacotherapy:    Metformin ER 1000 mg BID  Mounjaro 2.5mg weekly  Has 3 doses so far  Some diarrhea after 1st injection, but has improved since then    Adherence: denies non-adherence     Previously trialed meds:   Glipizide - lows  Rybelsus - reports it is ineffective  Januvia - stopped when switched to Mounjaro     Social:  Current diet: on average, now eating 2-3 meals per day, a little appetite suppression with Mounjaro  Breakfast - 2 eggs + spinach + avocado + cheese  Lunch - salad + protein  Dinner - pulled pork + salad + chips  Snack - mixed nuts, sometimes ice cream and cookies  Fluids - water, coffee (SF creamer + almond milk), diet pop rarely  Current exercise: biking and HIT, has a  for weight bearing  exercise      Allergies:  Patient has no known allergies.    Medication list:  Current Outpatient Medications   Medication Instructions    atorvastatin (LIPITOR) 10 mg, oral, Daily    blood sugar diagnostic (OneTouch Ultra Test) strip USE 2 STRIP DAILY    levothyroxine (Synthroid, Levoxyl) 100 mcg tablet TAKE 1 TABLET BY MOUTH EARLY IN THE MORNING.. TAKE ON AN EMPTY STOMACH AT THE SAME TIME EACH DAY    metFORMIN XR (GLUCOPHAGE-XR) 1,000 mg, oral, 2 times daily (morning and late afternoon), Do not crush, chew, or split.    SITagliptin phosphate (JANUVIA) 100 mg, oral, Daily    tadalafil (Cialis) 5 mg tablet Take by mouth.    tamsulosin (FLOMAX) 0.4 mg, Daily        Objective   Last Recorded Vitals:  BP Readings from Last 3 Encounters:   11/21/24 144/86   06/13/24 133/77   07/14/23 128/74     Wt Readings from Last 3 Encounters:   11/21/24 76.2 kg (168 lb)   06/13/24 78 kg (172 lb)   07/14/23 77.6 kg (171 lb)     BMI Readings from Last 1 Encounters:   11/21/24 25.54 kg/m²      Labs  A1C  Lab Results   Component Value Date    HGBA1C 7.7 (A) 11/21/2024    HGBA1C 8.0 (H) 09/24/2024    HGBA1C 6.9 (A) 06/13/2024     BMP/LFTs  Lab Results   Component Value Date    CREATININE 1.29 02/14/2024    CREATININE 1.32 (H) 11/13/2023    CREATININE 1.46 (H) 02/22/2023    EGFR 65 02/14/2024    EGFR 63 11/13/2023    GLUCOSE 176 (H) 02/14/2024     02/14/2024    K 4.4 02/14/2024     02/14/2024    CALCIUM 9.5 02/14/2024    CO2 31 02/14/2024    BUN 18 02/14/2024    ALT 19 02/14/2024    AST 16 02/14/2024    ALKPHOS 53 02/14/2024    BILITOT 0.7 02/14/2024     Lipids  Lab Results   Component Value Date    TRIG 106 02/14/2024    CHOL 139 02/14/2024    LDLF 52 02/22/2023    LDLCALC 67 02/14/2024    HDL 50.4 02/14/2024     Urine Albumin Creatinine Ratio  Lab Results   Component Value Date    MICROALBCREA  11/20/2023      Comment:      One or more analytes used in this calculation is outside of the analytical measurement range.  "Calculation cannot be performed.    MICROALBCREA 26.3 02/22/2023     ASCVD risk  The ASCVD Risk score (Audery VOGEL, et al., 2019) failed to calculate for the following reasons:    The smoking status is invalid    Additional labs:  No results found for: \"FRUCTOSAMINE\", \"CPEPTIDE\", \"OLJ07IW\", \"NTIB\", \"ZNT8A\", \"INSAB\"    Home glucose monitoring:  Hypoglycemia: denies readings <70 mg/dL or s/sx of hypoglycemia  Date FBG   14-Jan 140   12-Jan 120   11-Jan 208   10-Jan 139   9-Jan 157   8-Jan 165   7-Jan 149                Assessment/Plan   Type 2 Diabetes Mellitus  Goal A1C <6.5%, above goal as of 11/2024. AM readings from home above goal on avg, no hypoglycemia noted. Pt currently tolerating transition from Januvia to Mounjaro w/o ADRs. However ,has also been consuming more carbs after this switch d/t the improvement in blood sugar control. Discussed how diet impacts blood sugars, pt receptive, but also agreeable to continue titrating Mounjaro.  Plan:  Increase Mounjaro from 2.5mg --> 5mg once weekly  Continue metformin  Home glucose monitoring:   Patient encouraged to continue SMBG at least 2-3x/week, alternating FBG and 2hr PPBG  Education Provided to Patient:   Glycemic goals  Potential ADRs of Mounjaro dose increase  Primary prevention:   Therapy: Moderate intensity statin   LDL result meets goal (2/2024)  Renal:  CKD: stage 2 - GFR 60-89  ACR: Incalculable (11/2023)  Renal protective agents: soon to be GLP1  DM medications are dosed appropriately for renal function  Labs: Endo ordered lipids, CMP, and ACR  PharmD follow-up: 2 months  Endo follow-up: 2/20/25    Patient agreeable to plan as above, contact information provided for any future questions or concerns.    Edmundo Aguero, PharmD    Type of encounter: virtual    Continue all meds under the continuation of care with the referring provider and clinical pharmacy team.    "

## 2025-02-10 ENCOUNTER — TELEPHONE (OUTPATIENT)
Dept: ENDOCRINOLOGY | Facility: CLINIC | Age: 60
End: 2025-02-10
Payer: COMMERCIAL

## 2025-02-10 NOTE — TELEPHONE ENCOUNTER
Dr Retana, we have a follow up on 2/20 but my current script of Monjaro 5mg will run out 2/15. We can wait until my appt to refill if you prefer but I’m not sure if it’s something they keep in stock. Also, I’d like to get a 90 day script (12 pens) with refills like my other maintenance meds. Thanks, Justin

## 2025-02-20 ENCOUNTER — APPOINTMENT (OUTPATIENT)
Dept: ENDOCRINOLOGY | Facility: CLINIC | Age: 60
End: 2025-02-20
Payer: COMMERCIAL

## 2025-02-20 VITALS
SYSTOLIC BLOOD PRESSURE: 108 MMHG | BODY MASS INDEX: 24.86 KG/M2 | HEIGHT: 68 IN | WEIGHT: 164 LBS | DIASTOLIC BLOOD PRESSURE: 62 MMHG

## 2025-02-20 DIAGNOSIS — E78.5 HYPERLIPIDEMIA, UNSPECIFIED HYPERLIPIDEMIA TYPE: ICD-10-CM

## 2025-02-20 DIAGNOSIS — E11.69 TYPE 2 DIABETES MELLITUS WITH OTHER SPECIFIED COMPLICATION, UNSPECIFIED WHETHER LONG TERM INSULIN USE (MULTI): Primary | ICD-10-CM

## 2025-02-20 DIAGNOSIS — E03.9 HYPOTHYROIDISM, UNSPECIFIED TYPE: ICD-10-CM

## 2025-02-20 LAB
POC FINGERSTICK BLOOD GLUCOSE: 163 MG/DL (ref 70–100)
POC HEMOGLOBIN A1C: 7 % (ref 4.2–6.5)

## 2025-02-20 PROCEDURE — 99214 OFFICE O/P EST MOD 30 MIN: CPT | Performed by: HOSPITALIST

## 2025-02-20 PROCEDURE — 3074F SYST BP LT 130 MM HG: CPT | Performed by: HOSPITALIST

## 2025-02-20 PROCEDURE — 83036 HEMOGLOBIN GLYCOSYLATED A1C: CPT | Performed by: HOSPITALIST

## 2025-02-20 PROCEDURE — 82962 GLUCOSE BLOOD TEST: CPT | Performed by: HOSPITALIST

## 2025-02-20 PROCEDURE — 3008F BODY MASS INDEX DOCD: CPT | Performed by: HOSPITALIST

## 2025-02-20 PROCEDURE — 3078F DIAST BP <80 MM HG: CPT | Performed by: HOSPITALIST

## 2025-02-20 RX ORDER — PSEUDOEPHEDRINE HCL 120 MG/1
120 TABLET, FILM COATED, EXTENDED RELEASE ORAL EVERY 12 HOURS
COMMUNITY
Start: 2025-02-06 | End: 2026-02-06

## 2025-02-20 RX ORDER — TIRZEPATIDE 5 MG/.5ML
5 INJECTION, SOLUTION SUBCUTANEOUS WEEKLY
Qty: 6 ML | Refills: 2 | Status: SHIPPED | OUTPATIENT
Start: 2025-02-20

## 2025-02-20 ASSESSMENT — ENCOUNTER SYMPTOMS
SLEEP DISTURBANCE: 0
SORE THROAT: 0
NERVOUS/ANXIOUS: 0
ARTHRALGIAS: 0
EYE ITCHING: 0
AGITATION: 0
ABDOMINAL PAIN: 0
CONSTIPATION: 0
CONSTITUTIONAL NEGATIVE: 1
DIARRHEA: 0
TROUBLE SWALLOWING: 0
DYSURIA: 0
PHOTOPHOBIA: 0
FREQUENCY: 0
VOMITING: 0
TREMORS: 0
ABDOMINAL DISTENTION: 0
HEADACHES: 0
LIGHT-HEADEDNESS: 0
PALPITATIONS: 0
CHEST TIGHTNESS: 0
NAUSEA: 0
SHORTNESS OF BREATH: 0
VOICE CHANGE: 0

## 2025-02-20 NOTE — PROGRESS NOTES
Subjective   Patient ID: Jessee Mead is a 59 y.o. male who presents for Diabetes (IN OFFICE VISIT /Dx dm: prior 2003 /Failed: glipizide (low bg)/PCP: Dr. Ramirez/Podiatry: does not see one /Eye exam: 1/2024/Pt testing glucose 1-2 times daily/ALSO WORKING WITH  PHARMACY TO [facility] ).  Lab Results   Component Value Date    HGBA1C 7.0 (A) 02/20/2025      HPI    Review of Systems   Constitutional: Negative.    HENT:  Negative for sore throat, trouble swallowing and voice change.    Eyes:  Negative for photophobia, itching and visual disturbance.   Respiratory:  Negative for chest tightness and shortness of breath.    Cardiovascular:  Negative for chest pain and palpitations.   Gastrointestinal:  Negative for abdominal distention, abdominal pain, constipation, diarrhea, nausea and vomiting.   Endocrine: Negative for cold intolerance, heat intolerance and polyuria.   Genitourinary:  Negative for dysuria and frequency.   Musculoskeletal:  Negative for arthralgias.   Skin:  Negative for pallor.   Allergic/Immunologic: Negative for environmental allergies.   Neurological:  Negative for tremors, light-headedness and headaches.   Psychiatric/Behavioral:  Negative for agitation and sleep disturbance. The patient is not nervous/anxious.        Objective   Physical Exam  Constitutional:       Appearance: Normal appearance.   HENT:      Head: Normocephalic.      Nose: Nose normal.      Mouth/Throat:      Mouth: Mucous membranes are moist.   Eyes:      Extraocular Movements: Extraocular movements intact.   Cardiovascular:      Rate and Rhythm: Normal rate.   Pulmonary:      Effort: Pulmonary effort is normal. No respiratory distress.   Abdominal:      General: There is no distension.   Musculoskeletal:         General: Normal range of motion.      Cervical back: Normal range of motion and neck supple.   Skin:     General: Skin is warm and dry.   Neurological:      Mental Status: He is alert and oriented to person, place, and  "time.   Psychiatric:         Mood and Affect: Mood normal.      Visit Vitals  /62   Ht 1.727 m (5' 8\")   Wt 74.4 kg (164 lb)   BMI 24.94 kg/m²   BSA 1.89 m²        Assessment/Plan   Diagnoses and all orders for this visit:  Type 2 diabetes mellitus with other specified complication, unspecified whether long term insulin use (Multi)  -     POCT glycosylated hemoglobin (Hb A1C) manually resulted  -     POCT glucose manually resulted  -     tirzepatide (Mounjaro) 5 mg/0.5 mL pen injector; Inject 5 mg under the skin 1 (one) time per week.  -     Comprehensive metabolic panel; Future  -     Lipid Panel; Future  -     Hemoglobin A1c; Future  -     Albumin-Creatinine Ratio, Urine Random; Future  Hypothyroidism, unspecified type  -     TSH with reflex to Free T4 if abnormal; Future  Hyperlipidemia, unspecified hyperlipidemia type            60yo man with T2 DM came for follow up   Dx ~ 20 yrs ago       Current regimen:   Metformin 500 mg - 2 tab BID   Mounjaro 5 mg once weekly ( 12/ 2024)     Past medications:   Stop Januvia when starting Mounjaro  Stopped Rybelsus as it did not help his blood sugars as much as Januvia      He keeps a very close eye on his diet  He mentioned last visit he had been slightly less active than usual, he was unable to run a half marathon that he was training in September 2024    He was doing doing MODIFIED KETO DIET .  Not at this time  IChecks BG 1-2 / d-  -160s in morning , 130s later during day    A1c improved and around goal of < 7 %      PLAN:   No change in regimen.  Will hold off increasing the Mounjaro dose at this time.  Continue lifestyle modification.  Discussed mechanism of action side effects of Mounjaro last visit including pancreatitis, cholecystitis, dehydration, GI side effects with Mounjaro  -Continue to check BG at least twice a day   - continue statin, LDL at goal   - not on ACEi/ ARB, will discuss next visit      Given DEXCOM G 7 cgm sample in past.  Will hold " off trying that given it might give too much information and may make him overwhelmed      # Hypothyroidism  Clinically euthyroid  Levothyroxine 100 mcg daily    Repeat blood work  \   RTC 2m      SH- travels a lot for work, runs marathons in past. very active in general. daughter doing doctorate in PT   On  modified keto. / IF   Could not run half marathon in September 2024 due to knee pain  He travelled to [facility] in Sept 2023  Back pain improved.   kids live in [place]. Their neighbor has a bird sanctuary   He mentioned last visit he had been slightly less active than usual, he was unable to run a half marathon that he was training in September 2024    This was an in person visit and not virtual

## 2025-03-07 DIAGNOSIS — E03.9 HYPOTHYROIDISM, UNSPECIFIED: ICD-10-CM

## 2025-03-07 DIAGNOSIS — E11.9 TYPE 2 DIABETES MELLITUS WITHOUT COMPLICATIONS (MULTI): ICD-10-CM

## 2025-03-07 RX ORDER — ATORVASTATIN CALCIUM 10 MG/1
10 TABLET, FILM COATED ORAL DAILY
Qty: 90 TABLET | Refills: 1 | Status: SHIPPED | OUTPATIENT
Start: 2025-03-07

## 2025-03-07 RX ORDER — LEVOTHYROXINE SODIUM 100 UG/1
TABLET ORAL
Qty: 90 TABLET | Refills: 1 | Status: SHIPPED | OUTPATIENT
Start: 2025-03-07

## 2025-03-15 DIAGNOSIS — E11.9 TYPE 2 DIABETES MELLITUS WITHOUT COMPLICATIONS (MULTI): ICD-10-CM

## 2025-03-15 DIAGNOSIS — E11.69 TYPE 2 DIABETES MELLITUS WITH OTHER SPECIFIED COMPLICATION, UNSPECIFIED WHETHER LONG TERM INSULIN USE (MULTI): ICD-10-CM

## 2025-03-17 RX ORDER — METFORMIN HYDROCHLORIDE 500 MG/1
TABLET ORAL
Qty: 270 TABLET | Refills: 0 | OUTPATIENT
Start: 2025-03-17

## 2025-03-17 RX ORDER — BLOOD SUGAR DIAGNOSTIC
STRIP MISCELLANEOUS
Qty: 200 STRIP | Refills: 1 | Status: SHIPPED | OUTPATIENT
Start: 2025-03-17

## 2025-03-19 NOTE — PROGRESS NOTES
Patient is sent at the request of Azael Desai MD for my opinion regarding diabetes.  My recommendations below will be communicated back to the requesting provider by way of shared medical record.    Recommendations:   Increase Mounjaro from 5mg --> 7.55mg once weekly  Continue metformin  ________________________________________________________________________    Subjective   Past Medical History:  Patient Active Problem List   Diagnosis    Type 2 diabetes mellitus    Hyperlipidemia    Hypothyroidism     Interim:  Jessee Mead is a 59 y.o. male presents today for follow up visit with sima PharmMAME for Type 2 Diabetes Mellitus. Last seen by myself (Edmundo Aguero) on 1/14/25 where Mounjaro increased from 2.5 --> 5mg weekly. Pt saw endo on 2/20 where no changes were made.    Today the patient reports he is doing well, denies acute concerns. Does state he has continued to stay active, but has been having some more carbs recently since Mounjaro has been working well for blood sugars. Pt states he is tolerating it well, and has lost ~5 lbs since starting.     Diabetes Pharmacotherapy:    Metformin ER 1000 mg BID  Mounjaro 5mg weekly (Saturday)  Has 2 months so far  Denies ADRs     Adherence: denies non-adherence     Previously trialed meds:   Glipizide - lows  Rybelsus - reports it is ineffective  Januvia - stopped when switched to Mounjaro     Social:  Current diet: on average, now eating 2-3 meals per day, eating a few more carbs now that blood sugars have been improving  Breakfast - 2 eggs + spinach + avocado + cheese  Lunch - salad + protein  Dinner - pulled pork + salad + chips  Snack - mixed nuts, sometimes ice cream and cookies  Fluids - water, coffee (SF creamer + almond milk), diet pop rarely  Current exercise: biking and HIT, has a  for weight bearing exercise      Allergies:  Patient has no known allergies.    Medication list:  Current Outpatient Medications   Medication Instructions     atorvastatin (LIPITOR) 10 mg, oral, Daily    levothyroxine (Synthroid, Levoxyl) 100 mcg tablet TAKE 1 TABLET BY MOUTH EARLY IN THE MORNING.. TAKE ON AN EMPTY STOMACH AT THE SAME TIME EACH DAY    metFORMIN XR (GLUCOPHAGE-XR) 1,000 mg, oral, 2 times daily (morning and late afternoon), Do not crush, chew, or split.    Mounjaro 5 mg, subcutaneous, Weekly    OneTouch Ultra Test strip USE 2 STRIP DAILY    pseudoephedrine ER (SUDAFED-12 HOUR) 120 mg, Every 12 hours    tadalafil (Cialis) 5 mg tablet Take by mouth.    tamsulosin (FLOMAX) 0.4 mg, Daily        Objective   Last Recorded Vitals:  BP Readings from Last 3 Encounters:   02/20/25 108/62   11/21/24 144/86   06/13/24 133/77     Wt Readings from Last 3 Encounters:   02/20/25 74.4 kg (164 lb)   11/21/24 76.2 kg (168 lb)   06/13/24 78 kg (172 lb)     BMI Readings from Last 1 Encounters:   02/20/25 24.94 kg/m²      Labs  A1C  Lab Results   Component Value Date    HGBA1C 7.0 (A) 02/20/2025    HGBA1C 7.7 (A) 11/21/2024    HGBA1C 8.0 (H) 09/24/2024     BMP/LFTs  Lab Results   Component Value Date    CREATININE 1.29 02/14/2024    CREATININE 1.32 (H) 11/13/2023    CREATININE 1.46 (H) 02/22/2023    EGFR 65 02/14/2024    EGFR 63 11/13/2023    GLUCOSE 176 (H) 02/14/2024     02/14/2024    K 4.4 02/14/2024     02/14/2024    CALCIUM 9.5 02/14/2024    CO2 31 02/14/2024    BUN 18 02/14/2024    ALT 19 02/14/2024    AST 16 02/14/2024    ALKPHOS 53 02/14/2024    BILITOT 0.7 02/14/2024     Lipids  Lab Results   Component Value Date    TRIG 106 02/14/2024    CHOL 139 02/14/2024    LDLF 52 02/22/2023    LDLCALC 67 02/14/2024    HDL 50.4 02/14/2024     Urine Albumin Creatinine Ratio  Lab Results   Component Value Date    MICROALBCREA  11/20/2023      Comment:      One or more analytes used in this calculation is outside of the analytical measurement range. Calculation cannot be performed.    MICROALBCREA 26.3 02/22/2023     ASCVD risk  The ASCVD Risk score ([place] DK, et al.,  "2019) failed to calculate for the following reasons:    The smoking status is invalid    Additional labs:  No results found for: \"FRUCTOSAMINE\", \"CPEPTIDE\", \"EYV45RN\", \"NTIB\", \"ZNT8A\", \"INSAB\"    Home glucose monitoring:  Hypoglycemia: denies readings <70 mg/dL or s/sx of hypoglycemia  FBG   136   152   126   155   161   139   159   159   138      AVG   147         Assessment/Plan   Type 2 Diabetes Mellitus  Goal A1C <6.5%, improved and is reasonable, but above goal as of 2/2025. AM readings from home above goal on avg, no hypoglycemia noted. Pt currently Mounjaro w/o ADRs. However ,has also been consuming some more carbs after dose increase d/t the improvement in blood sugar control. Discussed how diet impacts blood sugars, pt receptive, but also agreeable to continue titrating Mounjaro for additional glucose control.  Plan:  Increase Mounjaro from 5mg --> 7.55mg once weekly  Continue metformin  Home glucose monitoring:   Patient encouraged to continue SMBG at least 2-3x/week, alternating FBG and 2hr PPBG  Education Provided to Patient:   Glycemic goals  Potential ADRs of Mounjaro dose increase  Primary prevention:   Therapy: Moderate intensity statin   LDL result meets goal (2/2024)  Renal:  CKD: stage 2 - GFR 60-89  ACR: Incalculable (11/2023)  Renal protective agents: soon to be GLP1  DM medications are dosed appropriately for renal function  Labs: Endo ordered A1C, lipids, CMP, and ACR  PharmD follow-up: 5 months  Endo follow-up: 6/19/25    Patient agreeable to plan as above, contact information provided for any future questions or concerns.    Edmundo Aguero, PharmD    Type of encounter: virtual    Continue all meds under the continuation of care with the referring provider and clinical pharmacy team.    "

## 2025-03-20 ENCOUNTER — APPOINTMENT (OUTPATIENT)
Dept: PHARMACY | Facility: HOSPITAL | Age: 60
End: 2025-03-20
Payer: COMMERCIAL

## 2025-03-20 DIAGNOSIS — E11.69 TYPE 2 DIABETES MELLITUS WITH OTHER SPECIFIED COMPLICATION, UNSPECIFIED WHETHER LONG TERM INSULIN USE (MULTI): ICD-10-CM

## 2025-03-20 RX ORDER — TIRZEPATIDE 7.5 MG/.5ML
7.5 INJECTION, SOLUTION SUBCUTANEOUS WEEKLY
Qty: 2 ML | Refills: 11 | Status: SHIPPED | OUTPATIENT
Start: 2025-03-20 | End: 2025-03-20

## 2025-03-20 RX ORDER — TIRZEPATIDE 7.5 MG/.5ML
7.5 INJECTION, SOLUTION SUBCUTANEOUS WEEKLY
Qty: 6 ML | Refills: 3 | Status: SHIPPED | OUTPATIENT
Start: 2025-03-20

## 2025-03-20 NOTE — PATIENT INSTRUCTIONS
Increase Mounjaro from 5mg --> 7.55mg once weekly  Continue metformin  If you have any questions or concerns, call me at: [phone]

## 2025-05-15 DIAGNOSIS — E03.9 HYPOTHYROIDISM, UNSPECIFIED TYPE: ICD-10-CM

## 2025-05-15 DIAGNOSIS — E11.69 TYPE 2 DIABETES MELLITUS WITH OTHER SPECIFIED COMPLICATION, UNSPECIFIED WHETHER LONG TERM INSULIN USE (MULTI): ICD-10-CM

## 2025-06-17 LAB
ALBUMIN SERPL-MCNC: 4.2 G/DL (ref 3.6–5.1)
ALBUMIN/CREAT UR: 3 MG/G CREAT
ALP SERPL-CCNC: 85 U/L (ref 35–144)
ALT SERPL-CCNC: 20 U/L (ref 9–46)
ANION GAP SERPL CALCULATED.4IONS-SCNC: 7 MMOL/L (CALC) (ref 7–17)
AST SERPL-CCNC: 18 U/L (ref 10–35)
BILIRUB SERPL-MCNC: 0.5 MG/DL (ref 0.2–1.2)
BUN SERPL-MCNC: 23 MG/DL (ref 7–25)
CALCIUM SERPL-MCNC: 9.4 MG/DL (ref 8.6–10.3)
CHLORIDE SERPL-SCNC: 101 MMOL/L (ref 98–110)
CHOLEST SERPL-MCNC: 111 MG/DL
CHOLEST/HDLC SERPL: 2.4 (CALC)
CO2 SERPL-SCNC: 29 MMOL/L (ref 20–32)
CREAT SERPL-MCNC: 1.18 MG/DL (ref 0.7–1.3)
CREAT UR-MCNC: 156 MG/DL (ref 20–320)
EGFRCR SERPLBLD CKD-EPI 2021: 72 ML/MIN/1.73M2
EST. AVERAGE GLUCOSE BLD GHB EST-MCNC: 148 MG/DL
EST. AVERAGE GLUCOSE BLD GHB EST-SCNC: 8.2 MMOL/L
GLUCOSE SERPL-MCNC: 101 MG/DL (ref 65–99)
HBA1C MFR BLD: 6.8 %
HDLC SERPL-MCNC: 47 MG/DL
LDLC SERPL CALC-MCNC: 49 MG/DL (CALC)
MICROALBUMIN UR-MCNC: 0.5 MG/DL
NONHDLC SERPL-MCNC: 64 MG/DL (CALC)
POTASSIUM SERPL-SCNC: 4.5 MMOL/L (ref 3.5–5.3)
PROT SERPL-MCNC: 6.2 G/DL (ref 6.1–8.1)
SODIUM SERPL-SCNC: 137 MMOL/L (ref 135–146)
TRIGL SERPL-MCNC: 73 MG/DL
TSH SERPL-ACNC: 1.56 MIU/L (ref 0.4–4.5)

## 2025-06-19 ENCOUNTER — APPOINTMENT (OUTPATIENT)
Dept: ENDOCRINOLOGY | Facility: CLINIC | Age: 60
End: 2025-06-19
Payer: COMMERCIAL

## 2025-06-19 VITALS
DIASTOLIC BLOOD PRESSURE: 60 MMHG | BODY MASS INDEX: 25.01 KG/M2 | WEIGHT: 165 LBS | HEIGHT: 68 IN | SYSTOLIC BLOOD PRESSURE: 120 MMHG

## 2025-06-19 DIAGNOSIS — E03.9 HYPOTHYROIDISM, UNSPECIFIED TYPE: ICD-10-CM

## 2025-06-19 DIAGNOSIS — E11.69 TYPE 2 DIABETES MELLITUS WITH OTHER SPECIFIED COMPLICATION, UNSPECIFIED WHETHER LONG TERM INSULIN USE (MULTI): Primary | ICD-10-CM

## 2025-06-19 DIAGNOSIS — E78.5 HYPERLIPIDEMIA, UNSPECIFIED HYPERLIPIDEMIA TYPE: ICD-10-CM

## 2025-06-19 PROCEDURE — 99214 OFFICE O/P EST MOD 30 MIN: CPT | Performed by: HOSPITALIST

## 2025-06-19 PROCEDURE — 3008F BODY MASS INDEX DOCD: CPT | Performed by: HOSPITALIST

## 2025-06-19 PROCEDURE — 3074F SYST BP LT 130 MM HG: CPT | Performed by: HOSPITALIST

## 2025-06-19 PROCEDURE — 3051F HG A1C>EQUAL 7.0%<8.0%: CPT | Performed by: HOSPITALIST

## 2025-06-19 PROCEDURE — 3078F DIAST BP <80 MM HG: CPT | Performed by: HOSPITALIST

## 2025-06-19 ASSESSMENT — ENCOUNTER SYMPTOMS
ABDOMINAL PAIN: 0
TROUBLE SWALLOWING: 0
TREMORS: 0
ABDOMINAL DISTENTION: 0
CONSTIPATION: 0
DIARRHEA: 0
PALPITATIONS: 0
VOMITING: 0
CONSTITUTIONAL NEGATIVE: 1
SHORTNESS OF BREATH: 0
EYE ITCHING: 0
NERVOUS/ANXIOUS: 0
LIGHT-HEADEDNESS: 0
PHOTOPHOBIA: 0
ARTHRALGIAS: 0
AGITATION: 0
VOICE CHANGE: 0
SORE THROAT: 0
CHEST TIGHTNESS: 0
NAUSEA: 0
FREQUENCY: 0
DYSURIA: 0
SLEEP DISTURBANCE: 0
HEADACHES: 0

## 2025-06-19 NOTE — PROGRESS NOTES
Subjective   Patient ID: jaja Mead is a 59 y.o. male who presents for Diabetes (Dx dm: prior 2003 /Failed: glipizide (low bg)/PCP: Dr. Ramirez/Podiatry: does not see one /Eye exam: yearly/Pt testing glucos 2 times daily   Lab Results   Component Value Date    HGBA1C 6.8 (H) 06/16/2025      HPI    Review of Systems   Constitutional: Negative.    HENT:  Negative for sore throat, trouble swallowing and voice change.    Eyes:  Negative for photophobia, itching and visual disturbance.   Respiratory:  Negative for chest tightness and shortness of breath.    Cardiovascular:  Negative for chest pain and palpitations.   Gastrointestinal:  Negative for abdominal distention, abdominal pain, constipation, diarrhea, nausea and vomiting.   Endocrine: Negative for cold intolerance, heat intolerance and polyuria.   Genitourinary:  Negative for dysuria and frequency.   Musculoskeletal:  Negative for arthralgias.   Skin:  Negative for pallor.   Allergic/Immunologic: Negative for environmental allergies.   Neurological:  Negative for tremors, light-headedness and headaches.   Psychiatric/Behavioral:  Negative for agitation and sleep disturbance. The patient is not nervous/anxious.        Objective   Physical Exam  Constitutional:       Appearance: Normal appearance.   HENT:      Head: Normocephalic.      Nose: Nose normal.      Mouth/Throat:      Mouth: Mucous membranes are moist.   Eyes:      Extraocular Movements: Extraocular movements intact.   Cardiovascular:      Rate and Rhythm: Normal rate.   Pulmonary:      Effort: Pulmonary effort is normal. No respiratory distress.   Abdominal:      General: There is no distension.   Musculoskeletal:         General: Normal range of motion.      Cervical back: Normal range of motion and neck supple.   Skin:     General: Skin is warm and dry.   Neurological:      Mental Status: He is alert and oriented to person, place, and time.   Psychiatric:         Mood and Affect: Mood normal.    Visit  "Vitals  /60   Ht 1.727 m (5' 8\")   Wt 74.8 kg (165 lb)   BMI 25.09 kg/m²   BSA 1.89 m²        Assessment/Plan   Diagnoses and all orders for this visit:  Type 2 diabetes mellitus with other specified complication, unspecified whether long term insulin use (Multi)  -     POCT glycosylated hemoglobin (Hb A1C) manually resulted  -     POCT glucose manually resulted  -     tirzepatide (Mounjaro) 5 mg/0.5 mL pen injector; Inject 5 mg under the skin 1 (one) time per week.  -     Comprehensive metabolic panel; Future  -     Lipid Panel; Future  -     Hemoglobin A1c; Future  -     Albumin-Creatinine Ratio, Urine Random; Future  Hypothyroidism, unspecified type  -     TSH with reflex to Free T4 if abnormal; Future  Hyperlipidemia, unspecified hyperlipidemia type            58yo man with T2 DM came for follow up   Dx ~ 20 yrs ago       Current regimen:   Metformin 500 mg - 2 tab BID   Mounjaro 7.5 mg once weekly ( 12/ 2024) feels    Past medications:   Stopped Januvia when started Mounjaro  Stopped Rybelsus as it did not help his blood sugars as much as Januvia      He keeps a very close eye on his diet  He mentioned last visit he had been slightly less active than usual, he was unable to run a half marathon that he was training in September 2024    He was doing doing MODIFIED KETO DIET .  Not at this time  IChecks BG 1-2 / d-  -140s     A1c improved and at goal of < 7 %      PLAN:   No change in regimen.  Will hold off increasing the Mounjaro dose at this time.  Continue lifestyle modification.  Discussed mechanism of action side effects of Mounjaro last visit including pancreatitis, cholecystitis, dehydration, GI side effects with Mounjaro  -Continue to check BG at least twice a day   - continue statin, LDL at goal   - not on ACEi/ ARB, will discuss next visit      Given DEXCOM G 7 cgm sample in past.  Will hold off trying that given it might give too much information and may make him overwhelmed      # " Hypothyroidism  Clinically euthyroid, TSH 1.56 on  6/16/2025  Levothyroxine 100 mcg daily  Continue same dose     RTC 4m      SH- travels a lot for work, runs marathons in past. very active in general. daughter doing doctorate in PT   On  modified keto. / IF   Could not run half marathon in September 2024 due to knee pain  He travelled to [place] in Sept 2023  Back pain improved.   kids live in [place]. Their neighbor has a bird sanctuary   He mentioned last visit he had been slightly less active than usual, he was unable to run a half marathon that he was training in September 2024  Planning to retire April 2026.  Job is stressful has to travel a lot

## 2025-08-03 DIAGNOSIS — E11.69 TYPE 2 DIABETES MELLITUS WITH OTHER SPECIFIED COMPLICATION, UNSPECIFIED WHETHER LONG TERM INSULIN USE (MULTI): ICD-10-CM

## 2025-08-04 RX ORDER — METFORMIN HYDROCHLORIDE 500 MG/1
TABLET, EXTENDED RELEASE ORAL
Qty: 360 TABLET | Refills: 0 | Status: SHIPPED | OUTPATIENT
Start: 2025-08-04

## 2025-08-19 ENCOUNTER — APPOINTMENT (OUTPATIENT)
Dept: PHARMACY | Facility: HOSPITAL | Age: 60
End: 2025-08-19
Payer: COMMERCIAL

## 2025-08-19 DIAGNOSIS — E11.69 TYPE 2 DIABETES MELLITUS WITH OTHER SPECIFIED COMPLICATION, UNSPECIFIED WHETHER LONG TERM INSULIN USE (MULTI): ICD-10-CM

## 2025-09-04 DIAGNOSIS — E03.9 HYPOTHYROIDISM, UNSPECIFIED: ICD-10-CM

## 2025-09-04 DIAGNOSIS — E11.9 TYPE 2 DIABETES MELLITUS WITHOUT COMPLICATIONS: ICD-10-CM

## 2025-09-04 RX ORDER — ATORVASTATIN CALCIUM 10 MG/1
10 TABLET, FILM COATED ORAL DAILY
Qty: 90 TABLET | Refills: 1 | Status: SHIPPED | OUTPATIENT
Start: 2025-09-04

## 2025-09-04 RX ORDER — LEVOTHYROXINE SODIUM 100 UG/1
TABLET ORAL
Qty: 90 TABLET | Refills: 1 | Status: SHIPPED | OUTPATIENT
Start: 2025-09-04

## 2025-10-22 ENCOUNTER — APPOINTMENT (OUTPATIENT)
Dept: ENDOCRINOLOGY | Facility: CLINIC | Age: 60
End: 2025-10-22
Payer: COMMERCIAL

## 2026-02-24 ENCOUNTER — APPOINTMENT (OUTPATIENT)
Dept: ENDOCRINOLOGY | Facility: CLINIC | Age: 61
End: 2026-02-24
Payer: COMMERCIAL

## 2026-06-25 ENCOUNTER — APPOINTMENT (OUTPATIENT)
Dept: ENDOCRINOLOGY | Facility: CLINIC | Age: 61
End: 2026-06-25
Payer: COMMERCIAL